# Patient Record
Sex: FEMALE | Race: BLACK OR AFRICAN AMERICAN
[De-identification: names, ages, dates, MRNs, and addresses within clinical notes are randomized per-mention and may not be internally consistent; named-entity substitution may affect disease eponyms.]

---

## 2018-07-01 ENCOUNTER — HOSPITAL ENCOUNTER (EMERGENCY)
Dept: HOSPITAL 92 - ERS | Age: 28
Discharge: HOME | End: 2018-07-01
Payer: SELF-PAY

## 2018-07-01 DIAGNOSIS — Z79.899: ICD-10-CM

## 2018-07-01 DIAGNOSIS — F41.9: ICD-10-CM

## 2018-07-01 DIAGNOSIS — M25.532: Primary | ICD-10-CM

## 2018-07-01 DIAGNOSIS — F17.290: ICD-10-CM

## 2018-07-01 NOTE — RAD
LEFT WRIST 4 VIEWS:

 

Date:  07/01/18 

 

HISTORY:  

Wrist pain. 

 

FINDINGS:

Carpals are normally aligned. No fracture identified. 

 

IMPRESSION: 

No acute abnormality identified. 

 

 

 

POS: [facility]

## 2018-12-08 ENCOUNTER — HOSPITAL ENCOUNTER (EMERGENCY)
Dept: HOSPITAL 92 - ERS | Age: 28
Discharge: HOME | End: 2018-12-08
Payer: COMMERCIAL

## 2018-12-08 DIAGNOSIS — F41.0: Primary | ICD-10-CM

## 2018-12-08 DIAGNOSIS — R07.89: ICD-10-CM

## 2018-12-08 DIAGNOSIS — Z79.899: ICD-10-CM

## 2018-12-08 PROCEDURE — 93005 ELECTROCARDIOGRAM TRACING: CPT

## 2018-12-08 PROCEDURE — 71045 X-RAY EXAM CHEST 1 VIEW: CPT

## 2018-12-08 NOTE — RAD
PORTABLE CHEST:

 

DATE: 12/8/2018.

 

PROVIDED CLINICAL HISTORY: 

Chest pain.

 

FINDINGS: 

Comparison 11/20/2013.

 

Cardiac silhouette appears enlarged, which may be at least partially on the basis of portable techniq
ue.  No focal consolidation, pleural fluid, or pneumothorax apparent.  

 

IMPRESSION: 

No evidence for an acute cardiopulmonary process.

 

POS: [facility]

## 2018-12-13 NOTE — EKG
Test Reason : ANXIETY

Blood Pressure : ***/*** mmHG

Vent. Rate : 095 BPM     Atrial Rate : 095 BPM

   P-R Int : 126 ms          QRS Dur : 082 ms

    QT Int : 354 ms       P-R-T Axes : 045 054 010 degrees

   QTc Int : 444 ms

 

Normal sinus rhythm

Normal ECG

 

Confirmed by ALEJO MONROE, FELIPE RICE ([id number]),  GERHARD JAMES ([id number]) on 12/13/2018 2:26:44 PM

 

Referred By:             Confirmed By:FELIPE DHILLON MD

## 2019-04-16 ENCOUNTER — HOSPITAL ENCOUNTER (EMERGENCY)
Dept: HOSPITAL 92 - ERS | Age: 29
Discharge: HOME | End: 2019-04-16
Payer: COMMERCIAL

## 2019-04-16 DIAGNOSIS — F41.9: ICD-10-CM

## 2019-04-16 DIAGNOSIS — R10.9: Primary | ICD-10-CM

## 2019-04-16 DIAGNOSIS — N76.0: ICD-10-CM

## 2019-04-16 DIAGNOSIS — I10: ICD-10-CM

## 2019-04-16 LAB
ALBUMIN SERPL BCG-MCNC: 4.1 G/DL (ref 3.5–5)
ALP SERPL-CCNC: 55 U/L (ref 40–150)
ALT SERPL W P-5'-P-CCNC: 20 U/L (ref 8–55)
ANION GAP SERPL CALC-SCNC: 11 MMOL/L (ref 10–20)
AST SERPL-CCNC: 18 U/L (ref 5–34)
BASOPHILS # BLD AUTO: 0.1 THOU/UL (ref 0–0.2)
BASOPHILS NFR BLD AUTO: 1.5 % (ref 0–1)
BILIRUB SERPL-MCNC: (no result) MG/DL (ref 0.2–1.2)
BUN SERPL-MCNC: 10 MG/DL (ref 7–18.7)
CALCIUM SERPL-MCNC: 9.8 MG/DL (ref 7.8–10.44)
CHLORIDE SERPL-SCNC: 106 MMOL/L (ref 98–107)
CO2 SERPL-SCNC: 26 MMOL/L (ref 22–29)
CREAT CL PREDICTED SERPL C-G-VRATE: 0 ML/MIN (ref 70–130)
EOSINOPHIL # BLD AUTO: 0.4 THOU/UL (ref 0–0.7)
EOSINOPHIL NFR BLD AUTO: 5.9 % (ref 0–10)
GLOBULIN SER CALC-MCNC: 3.2 G/DL (ref 2.4–3.5)
GLUCOSE SERPL-MCNC: 95 MG/DL (ref 70–105)
HGB BLD-MCNC: 12.1 G/DL (ref 12–16)
LIPASE SERPL-CCNC: 30 U/L (ref 8–78)
LYMPHOCYTES # BLD: 2 THOU/UL (ref 1.2–3.4)
LYMPHOCYTES NFR BLD AUTO: 32.5 % (ref 21–51)
MCH RBC QN AUTO: 29.3 PG (ref 27–31)
MCV RBC AUTO: 91.7 FL (ref 78–98)
MONOCYTES # BLD AUTO: 0.5 THOU/UL (ref 0.11–0.59)
MONOCYTES NFR BLD AUTO: 7.5 % (ref 0–10)
NEUTROPHILS # BLD AUTO: 3.3 THOU/UL (ref 1.4–6.5)
NEUTROPHILS NFR BLD AUTO: 52.6 % (ref 42–75)
PLATELET # BLD AUTO: 282 THOU/UL (ref 130–400)
POTASSIUM SERPL-SCNC: 4.6 MMOL/L (ref 3.5–5.1)
PREGU CONTROL BACKGROUND?: (no result)
PREGU CONTROL BAR APPEAR?: YES
RBC # BLD AUTO: 4.14 MILL/UL (ref 4.2–5.4)
SODIUM SERPL-SCNC: 138 MMOL/L (ref 136–145)
SP GR UR STRIP: 1.02 (ref 1–1.04)
WBC # BLD AUTO: 6.2 THOU/UL (ref 4.8–10.8)

## 2019-04-16 PROCEDURE — 87660 TRICHOMONAS VAGIN DIR PROBE: CPT

## 2019-04-16 PROCEDURE — 87510 GARDNER VAG DNA DIR PROBE: CPT

## 2019-04-16 PROCEDURE — 85025 COMPLETE CBC W/AUTO DIFF WBC: CPT

## 2019-04-16 PROCEDURE — 81003 URINALYSIS AUTO W/O SCOPE: CPT

## 2019-04-16 PROCEDURE — 87480 CANDIDA DNA DIR PROBE: CPT

## 2019-04-16 PROCEDURE — 80053 COMPREHEN METABOLIC PANEL: CPT

## 2019-04-16 PROCEDURE — 83690 ASSAY OF LIPASE: CPT

## 2019-04-16 PROCEDURE — 96372 THER/PROPH/DIAG INJ SC/IM: CPT

## 2019-04-16 PROCEDURE — 36415 COLL VENOUS BLD VENIPUNCTURE: CPT

## 2019-04-16 PROCEDURE — 87591 N.GONORRHOEAE DNA AMP PROB: CPT

## 2019-04-16 PROCEDURE — 74019 RADEX ABDOMEN 2 VIEWS: CPT

## 2019-04-16 PROCEDURE — 87491 CHLMYD TRACH DNA AMP PROBE: CPT

## 2019-04-16 PROCEDURE — 81025 URINE PREGNANCY TEST: CPT

## 2019-04-16 NOTE — RAD
KUB AND UPRIGHT:

 

History: Abdominal pain. 

 

FINDINGS: 

Bowel gas pattern is nonobstructed. No free air. No radiopaque calculi or significant bony findings. 


 

IMPRESSION: 

Unremarkable abdominal series. 

 

POS: [facility]

## 2019-04-25 ENCOUNTER — HOSPITAL ENCOUNTER (OUTPATIENT)
Dept: HOSPITAL 92 - BICULT | Age: 29
Discharge: HOME | End: 2019-04-25
Attending: FAMILY MEDICINE
Payer: COMMERCIAL

## 2019-04-25 DIAGNOSIS — N85.4: ICD-10-CM

## 2019-04-25 DIAGNOSIS — R10.9: Primary | ICD-10-CM

## 2019-04-25 PROCEDURE — 76856 US EXAM PELVIC COMPLETE: CPT

## 2019-04-25 PROCEDURE — 93976 VASCULAR STUDY: CPT

## 2019-04-25 NOTE — ULT
PELVIC SONOGRAM TRANSABDOMINAL IMAGING WITH DUPLEX EVALUATION:

 

HISTORY: 

Pelvic pain.

 

FINDINGS: 

Urinary bladder is decompressed.  Uterus is retroverted and is 10.1 cm.  Endometrium is 0.7 cm.  No f
ree fluid.

 

The right ovary is 3.1 cm and the left is 4.1 cm.  Each has a normal sonographic appearance with good
 color and spectral Doppler flow.

 

IMPRESSION: 

Retroverted uterus.  No other significant abnormalities are demonstrated.

 

POS: [facility]

## 2019-06-17 ENCOUNTER — HOSPITAL ENCOUNTER (EMERGENCY)
Dept: HOSPITAL 92 - ERS | Age: 29
Discharge: HOME | End: 2019-06-17
Payer: COMMERCIAL

## 2019-06-17 DIAGNOSIS — Z79.899: ICD-10-CM

## 2019-06-17 DIAGNOSIS — R10.13: Primary | ICD-10-CM

## 2019-06-17 DIAGNOSIS — F41.9: ICD-10-CM

## 2019-06-17 LAB
ALBUMIN SERPL BCG-MCNC: 4.4 G/DL (ref 3.5–5)
ALP SERPL-CCNC: 50 U/L (ref 40–150)
ALT SERPL W P-5'-P-CCNC: 16 U/L (ref 8–55)
ANION GAP SERPL CALC-SCNC: 10 MMOL/L (ref 10–20)
AST SERPL-CCNC: 20 U/L (ref 5–34)
BASOPHILS # BLD AUTO: 0 THOU/UL (ref 0–0.2)
BASOPHILS NFR BLD AUTO: 0.8 % (ref 0–1)
BILIRUB SERPL-MCNC: 0.2 MG/DL (ref 0.2–1.2)
BUN SERPL-MCNC: 8 MG/DL (ref 7–18.7)
CALCIUM SERPL-MCNC: 10.1 MG/DL (ref 7.8–10.44)
CHLORIDE SERPL-SCNC: 108 MMOL/L (ref 98–107)
CO2 SERPL-SCNC: 25 MMOL/L (ref 22–29)
CREAT CL PREDICTED SERPL C-G-VRATE: 0 ML/MIN (ref 70–130)
EOSINOPHIL # BLD AUTO: 0.2 THOU/UL (ref 0–0.7)
EOSINOPHIL NFR BLD AUTO: 5 % (ref 0–10)
GLOBULIN SER CALC-MCNC: 3.2 G/DL (ref 2.4–3.5)
GLUCOSE SERPL-MCNC: 86 MG/DL (ref 70–105)
HGB BLD-MCNC: 12.8 G/DL (ref 12–16)
LIPASE SERPL-CCNC: 19 U/L (ref 8–78)
LYMPHOCYTES # BLD: 1.8 THOU/UL (ref 1.2–3.4)
LYMPHOCYTES NFR BLD AUTO: 36.3 % (ref 21–51)
MCH RBC QN AUTO: 29.9 PG (ref 27–31)
MCV RBC AUTO: 91.5 FL (ref 78–98)
MONOCYTES # BLD AUTO: 0.4 THOU/UL (ref 0.11–0.59)
MONOCYTES NFR BLD AUTO: 8.7 % (ref 0–10)
NEUTROPHILS # BLD AUTO: 2.4 THOU/UL (ref 1.4–6.5)
NEUTROPHILS NFR BLD AUTO: 49.2 % (ref 42–75)
PLATELET # BLD AUTO: 279 THOU/UL (ref 130–400)
POTASSIUM SERPL-SCNC: 3.9 MMOL/L (ref 3.5–5.1)
PREGS CONTROL BACKGROUND?: (no result)
PREGS CONTROL BAR APPEAR?: YES
RBC # BLD AUTO: 4.3 MILL/UL (ref 4.2–5.4)
SODIUM SERPL-SCNC: 139 MMOL/L (ref 136–145)
SP GR UR STRIP: 1 (ref 1–1.04)
WBC # BLD AUTO: 5 THOU/UL (ref 4.8–10.8)

## 2019-06-17 PROCEDURE — 80053 COMPREHEN METABOLIC PANEL: CPT

## 2019-06-17 PROCEDURE — C9113 INJ PANTOPRAZOLE SODIUM, VIA: HCPCS

## 2019-06-17 PROCEDURE — 81003 URINALYSIS AUTO W/O SCOPE: CPT

## 2019-06-17 PROCEDURE — 83690 ASSAY OF LIPASE: CPT

## 2019-06-17 PROCEDURE — 76705 ECHO EXAM OF ABDOMEN: CPT

## 2019-06-17 PROCEDURE — 84703 CHORIONIC GONADOTROPIN ASSAY: CPT

## 2019-06-17 PROCEDURE — 85025 COMPLETE CBC W/AUTO DIFF WBC: CPT

## 2019-06-17 PROCEDURE — 87086 URINE CULTURE/COLONY COUNT: CPT

## 2019-06-17 PROCEDURE — 36415 COLL VENOUS BLD VENIPUNCTURE: CPT

## 2019-06-17 NOTE — ULT
GALLBLADDER ULTRASOUND:

 

INDICATIONS:

Abdominal pain.

 

FINDINGS:

The gallbladder has a normal sonographic appearance.  No evidence of gallstones.  The common duct is 
of normal caliber.  The pancreas is mostly obscured and is not well evaluated.  The visualized liver 
and right kidney appear unremarkable as visualized.

 

IMPRESSION:

Unremarkable gallbladder ultrasound.

 

POS: [facility]

## 2019-09-03 ENCOUNTER — HOSPITAL ENCOUNTER (EMERGENCY)
Dept: HOSPITAL 92 - ERS | Age: 29
Discharge: HOME | End: 2019-09-03
Payer: SELF-PAY

## 2019-09-03 DIAGNOSIS — F41.9: ICD-10-CM

## 2019-09-03 DIAGNOSIS — M54.2: Primary | ICD-10-CM

## 2019-09-03 DIAGNOSIS — Z79.899: ICD-10-CM
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
## 2019-09-03 PROCEDURE — 72125 CT NECK SPINE W/O DYE: CPT

## 2019-09-03 PROCEDURE — 70450 CT HEAD/BRAIN W/O DYE: CPT

## 2019-09-03 NOTE — CT
CT cervical spine noncontrast



HISTORY: Fall. Neck injury.



FINDINGS: Vertebral body heights are maintained. Gentle recurrent reversal of the normal lordotic cur
vature. Cervicothoracic junction is intact. No acute fracture or dislocation.











IMPRESSION: No acute osseous abnormalities are demonstrated.



Reported By: DELTA Hyde 

Electronically Signed:  9/3/2019 10:51 PM

## 2019-09-03 NOTE — CT
CT head noncontrast



HISTORY: Fall. Head injury.



FINDINGS: There is no evidence of acute intracranial hemorrhage or infarct. The ventricles appear nor
mal in size, shape and position. There is no mass effect or shift of midline structures. Visualized

paranasal sinuses remain well aerated.











IMPRESSION: No acute intracranial abnormalities are demonstrated.



Reported By: DELTA Hyde 

Electronically Signed:  9/3/2019 10:49 PM

## 2019-10-01 ENCOUNTER — HOSPITAL ENCOUNTER (EMERGENCY)
Dept: HOSPITAL 92 - ERS | Age: 29
Discharge: HOME | End: 2019-10-01
Payer: SELF-PAY

## 2019-10-01 DIAGNOSIS — F41.9: ICD-10-CM

## 2019-10-01 DIAGNOSIS — J06.9: Primary | ICD-10-CM

## 2019-10-01 LAB
PREGU CONTROL BACKGROUND?: (no result)
PREGU CONTROL BAR APPEAR?: YES

## 2019-10-01 PROCEDURE — 99283 EMERGENCY DEPT VISIT LOW MDM: CPT

## 2019-10-01 PROCEDURE — 87804 INFLUENZA ASSAY W/OPTIC: CPT

## 2019-10-01 PROCEDURE — 81003 URINALYSIS AUTO W/O SCOPE: CPT

## 2019-10-01 PROCEDURE — 81025 URINE PREGNANCY TEST: CPT

## 2019-11-17 ENCOUNTER — HOSPITAL ENCOUNTER (EMERGENCY)
Dept: HOSPITAL 92 - ERS | Age: 29
Discharge: HOME | End: 2019-11-17
Payer: SELF-PAY

## 2019-11-17 DIAGNOSIS — F41.9: Primary | ICD-10-CM

## 2019-11-17 DIAGNOSIS — Z79.899: ICD-10-CM

## 2019-11-17 PROCEDURE — 99283 EMERGENCY DEPT VISIT LOW MDM: CPT

## 2019-12-01 ENCOUNTER — HOSPITAL ENCOUNTER (EMERGENCY)
Dept: HOSPITAL 92 - ERS | Age: 29
Discharge: HOME | End: 2019-12-01
Payer: SELF-PAY

## 2019-12-01 DIAGNOSIS — Z79.899: ICD-10-CM

## 2019-12-01 DIAGNOSIS — F41.9: ICD-10-CM

## 2019-12-01 DIAGNOSIS — R11.2: Primary | ICD-10-CM

## 2019-12-01 LAB
PREGU CONTROL BACKGROUND?: (no result)
PREGU CONTROL BAR APPEAR?: YES
PROT UR STRIP.AUTO-MCNC: 10 MG/DL

## 2019-12-01 PROCEDURE — 99283 EMERGENCY DEPT VISIT LOW MDM: CPT

## 2019-12-01 PROCEDURE — 81003 URINALYSIS AUTO W/O SCOPE: CPT

## 2019-12-01 PROCEDURE — 81025 URINE PREGNANCY TEST: CPT

## 2020-03-04 ENCOUNTER — HOSPITAL ENCOUNTER (EMERGENCY)
Dept: HOSPITAL 92 - ERS | Age: 30
Discharge: HOME | End: 2020-03-04
Payer: SELF-PAY

## 2020-03-04 DIAGNOSIS — F17.200: ICD-10-CM

## 2020-03-04 DIAGNOSIS — J06.9: Primary | ICD-10-CM

## 2020-03-04 DIAGNOSIS — F41.9: ICD-10-CM

## 2020-03-04 PROCEDURE — 87804 INFLUENZA ASSAY W/OPTIC: CPT

## 2020-03-04 PROCEDURE — 71046 X-RAY EXAM CHEST 2 VIEWS: CPT

## 2020-03-05 NOTE — RAD
XR Chest Pa   Lat STANDARD



HISTORY: Fever



COMPARISON: 12/8/2018



FINDINGS: The heart size is normal. The lungs are well expanded without focal areas of consolidation,
 pneumothorax or pleural effusions.



IMPRESSION: No radiographic evidence of acute cardiopulmonary process.



Reported By: Nicola Mckinney 

Electronically Signed:  3/4/2020 7:48 AM

## 2020-07-26 ENCOUNTER — HOSPITAL ENCOUNTER (EMERGENCY)
Dept: HOSPITAL 92 - ERS | Age: 30
Discharge: ANOTHER HOSPITAL | End: 2020-07-26
Payer: SELF-PAY

## 2020-07-26 DIAGNOSIS — M54.5: Primary | ICD-10-CM

## 2020-07-26 DIAGNOSIS — F17.210: ICD-10-CM

## 2020-07-26 DIAGNOSIS — F41.9: ICD-10-CM

## 2020-07-26 DIAGNOSIS — R05: ICD-10-CM

## 2020-07-26 PROCEDURE — 99281 EMR DPT VST MAYX REQ PHY/QHP: CPT
